# Patient Record
Sex: MALE | Race: WHITE
[De-identification: names, ages, dates, MRNs, and addresses within clinical notes are randomized per-mention and may not be internally consistent; named-entity substitution may affect disease eponyms.]

---

## 2022-04-21 ENCOUNTER — HOSPITAL ENCOUNTER (OUTPATIENT)
Age: 52
End: 2022-04-21
Payer: OTHER GOVERNMENT

## 2022-04-21 DIAGNOSIS — Z98.52: Primary | ICD-10-CM

## 2022-04-21 PROCEDURE — 89321 SEMEN ANAL SPERM DETECTION: CPT

## 2022-05-27 ENCOUNTER — HOSPITAL ENCOUNTER (OUTPATIENT)
Age: 52
End: 2022-05-27
Payer: OTHER GOVERNMENT

## 2022-05-27 DIAGNOSIS — R63.4: ICD-10-CM

## 2022-05-27 DIAGNOSIS — R19.09: ICD-10-CM

## 2022-05-27 DIAGNOSIS — Z12.5: ICD-10-CM

## 2022-05-27 DIAGNOSIS — F41.8: Primary | ICD-10-CM

## 2022-05-27 DIAGNOSIS — M25.521: ICD-10-CM

## 2022-05-27 LAB
ADD MANUAL DIFF / SLIDE REVIEW: NO
ALBUMIN SERPL-MCNC: 4.7 G/DL (ref 3.5–5)
ALBUMIN/GLOB SERPL: 1.6 {RATIO} (ref 1–2.8)
ALP SERPL-CCNC: 75 U/L (ref 38–126)
ALT SERPL-CCNC: 21 IU/L (ref ?–50)
BUN SERPL-MCNC: 15 MG/DL (ref 9–20)
CALCIUM SERPL-MCNC: 9.1 MG/DL (ref 8.4–10.2)
CHLORIDE SERPL-SCNC: 101 MMOL/L (ref 98–107)
CHOLEST SERPL-MCNC: 197 MG/DL (ref 140–199)
CO2 SERPL-SCNC: 27 MMOL/L (ref 22–32)
ESTIMATED GLOMERULAR FILT RATE: > 60 ML/MIN (ref 60–?)
GLOBULIN SER CALC-MCNC: 3 G/DL (ref 1.7–4.1)
GLUCOSE SERPL-MCNC: 97 MG/DL (ref 70–100)
HDLC SERPL-MCNC: 63 MG/DL (ref 40–60)
HEMATOCRIT: 43.3 % (ref 41–53)
HEMOGLOBIN: 15.1 G/DL (ref 13.5–17.5)
HEMOLYSIS: < 15 (ref 0–50)
LYMPHOCYTES # SPEC AUTO: 1600 /UL (ref 1100–4500)
MCV RBC: 94.9 FL (ref 80–100)
MEAN CORPUSCULAR HEMOGLOBIN: 33.2 PG (ref 26–34)
MEAN CORPUSCULAR HGB CONC: 34.9 % (ref 30–36)
MICROALBUMI CREATININ RATIO UR: (no result) UG/MG CR (ref ?–30)
PLATELET COUNT: 239 X10^3/UL (ref 150–400)
POTASSIUM SERPL-SCNC: 4.3 MMOL/L (ref 3.4–5.1)
PROT SERPL-MCNC: 7.7 G/DL (ref 6.3–8.2)
SODIUM SERPL-SCNC: 138 MMOL/L (ref 137–145)
TRIGL SERPL-MCNC: 56 MG/DL (ref 35–150)
TSH W/ REFLEX TO FT4: 1.36 UIU/ML (ref 0.47–4.68)

## 2022-05-27 PROCEDURE — 80053 COMPREHEN METABOLIC PANEL: CPT

## 2022-05-27 PROCEDURE — 80061 LIPID PANEL: CPT

## 2022-05-27 PROCEDURE — 84443 ASSAY THYROID STIM HORMONE: CPT

## 2022-05-27 PROCEDURE — 36415 COLL VENOUS BLD VENIPUNCTURE: CPT

## 2022-05-27 PROCEDURE — 85025 COMPLETE CBC W/AUTO DIFF WBC: CPT

## 2022-05-27 PROCEDURE — 82043 UR ALBUMIN QUANTITATIVE: CPT

## 2022-05-27 PROCEDURE — 82570 ASSAY OF URINE CREATININE: CPT

## 2022-06-30 ENCOUNTER — HOSPITAL ENCOUNTER (OUTPATIENT)
Age: 52
End: 2022-06-30
Payer: OTHER GOVERNMENT

## 2022-06-30 DIAGNOSIS — R19.09: Primary | ICD-10-CM

## 2022-06-30 PROCEDURE — 76705 ECHO EXAM OF ABDOMEN: CPT

## 2022-10-24 ENCOUNTER — HOSPITAL ENCOUNTER (OUTPATIENT)
Age: 52
End: 2022-10-24
Payer: OTHER GOVERNMENT

## 2022-10-24 DIAGNOSIS — F43.21: ICD-10-CM

## 2022-10-24 DIAGNOSIS — E55.9: Primary | ICD-10-CM

## 2022-10-24 DIAGNOSIS — R53.83: ICD-10-CM

## 2022-10-24 DIAGNOSIS — R68.82: ICD-10-CM

## 2022-10-24 LAB — 25(OH)D3+25(OH)D2 SERPL-MCNC: 34.5 NG/ML (ref 30–100)

## 2022-10-24 PROCEDURE — 36415 COLL VENOUS BLD VENIPUNCTURE: CPT

## 2022-10-24 PROCEDURE — 82306 VITAMIN D 25 HYDROXY: CPT

## 2022-10-24 PROCEDURE — 84403 ASSAY OF TOTAL TESTOSTERONE: CPT

## 2023-10-31 ENCOUNTER — HOSPITAL ENCOUNTER (OUTPATIENT)
Age: 53
End: 2023-10-31
Payer: OTHER GOVERNMENT

## 2023-10-31 DIAGNOSIS — F43.12: ICD-10-CM

## 2023-10-31 DIAGNOSIS — F41.8: ICD-10-CM

## 2023-10-31 DIAGNOSIS — Z12.5: ICD-10-CM

## 2023-10-31 DIAGNOSIS — E78.5: Primary | ICD-10-CM

## 2023-10-31 LAB
ADD MANUAL DIFF / SLIDE REVIEW: NO
ALBUMIN SERPL-MCNC: 4.3 G/DL (ref 3.5–5)
ALBUMIN/GLOB SERPL: 1.4 {RATIO} (ref 1–2.8)
ALP SERPL-CCNC: 71 U/L (ref 38–126)
ALT SERPL-CCNC: 28 IU/L (ref ?–50)
BUN SERPL-MCNC: 15 MG/DL (ref 9–20)
CALCIUM SERPL-MCNC: 9.4 MG/DL (ref 8.4–10.2)
CHLORIDE SERPL-SCNC: 100 MMOL/L (ref 98–107)
CHOLEST SERPL-MCNC: 185 MG/DL (ref 140–199)
CO2 SERPL-SCNC: 29 MMOL/L (ref 22–32)
ESTIMATED GLOMERULAR FILT RATE: > 60 ML/MIN (ref 60–?)
GLOBULIN SER CALC-MCNC: 3 G/DL (ref 1.7–4.1)
GLUCOSE SERPL-MCNC: 100 MG/DL (ref 70–100)
HDLC SERPL-MCNC: 65 MG/DL (ref 40–60)
HEMATOCRIT: 44.5 % (ref 41–53)
HEMOGLOBIN: 15.1 G/DL (ref 13.5–17.5)
HEMOLYSIS: < 15 (ref 0–50)
LYMPHOCYTES # SPEC AUTO: 1400 /UL (ref 1100–4500)
MCV RBC: 98.7 FL (ref 80–100)
MEAN CORPUSCULAR HEMOGLOBIN: 33.5 PG (ref 26–34)
MEAN CORPUSCULAR HGB CONC: 33.9 % (ref 30–36)
PLATELET COUNT: 247 X10^3/UL (ref 150–400)
POTASSIUM SERPL-SCNC: 4.1 MMOL/L (ref 3.4–5.1)
PROT SERPL-MCNC: 7.3 G/DL (ref 6.3–8.2)
SODIUM SERPL-SCNC: 136 MMOL/L (ref 137–145)
TRIGL SERPL-MCNC: 59 MG/DL (ref 35–150)
TSH W/ REFLEX TO FT4: 1.28 UIU/ML (ref 0.47–4.68)

## 2023-10-31 PROCEDURE — 85025 COMPLETE CBC W/AUTO DIFF WBC: CPT

## 2023-10-31 PROCEDURE — 80053 COMPREHEN METABOLIC PANEL: CPT

## 2023-10-31 PROCEDURE — 84443 ASSAY THYROID STIM HORMONE: CPT

## 2023-10-31 PROCEDURE — 80061 LIPID PANEL: CPT

## 2023-10-31 PROCEDURE — 36415 COLL VENOUS BLD VENIPUNCTURE: CPT

## 2025-03-25 ENCOUNTER — HOSPITAL ENCOUNTER (OUTPATIENT)
Age: 55
Discharge: HOME | End: 2025-03-25
Payer: OTHER GOVERNMENT

## 2025-03-25 VITALS
HEART RATE: 77 BPM | SYSTOLIC BLOOD PRESSURE: 99 MMHG | RESPIRATION RATE: 14 BRPM | DIASTOLIC BLOOD PRESSURE: 62 MMHG | OXYGEN SATURATION: 96 %

## 2025-03-25 VITALS — BODY MASS INDEX: 9.9 KG/M2 | BODY MASS INDEX: 21.7 KG/M2

## 2025-03-25 VITALS
SYSTOLIC BLOOD PRESSURE: 121 MMHG | OXYGEN SATURATION: 97 % | DIASTOLIC BLOOD PRESSURE: 68 MMHG | HEART RATE: 95 BPM | RESPIRATION RATE: 14 BRPM

## 2025-03-25 VITALS
TEMPERATURE: 97.16 F | DIASTOLIC BLOOD PRESSURE: 65 MMHG | HEART RATE: 91 BPM | SYSTOLIC BLOOD PRESSURE: 112 MMHG | RESPIRATION RATE: 14 BRPM | OXYGEN SATURATION: 98 %

## 2025-03-25 VITALS
HEART RATE: 55 BPM | RESPIRATION RATE: 16 BRPM | OXYGEN SATURATION: 99 % | SYSTOLIC BLOOD PRESSURE: 118 MMHG | TEMPERATURE: 97.1 F | DIASTOLIC BLOOD PRESSURE: 72 MMHG

## 2025-03-25 VITALS
HEART RATE: 91 BPM | RESPIRATION RATE: 14 BRPM | DIASTOLIC BLOOD PRESSURE: 75 MMHG | OXYGEN SATURATION: 98 % | SYSTOLIC BLOOD PRESSURE: 115 MMHG

## 2025-03-25 VITALS
TEMPERATURE: 97.7 F | SYSTOLIC BLOOD PRESSURE: 119 MMHG | OXYGEN SATURATION: 96 % | HEART RATE: 91 BPM | RESPIRATION RATE: 16 BRPM | DIASTOLIC BLOOD PRESSURE: 72 MMHG

## 2025-03-25 DIAGNOSIS — K40.90: Primary | ICD-10-CM

## 2025-03-25 DIAGNOSIS — K42.9: ICD-10-CM

## 2025-03-25 PROCEDURE — C1781 MESH (IMPLANTABLE): HCPCS

## 2025-03-25 PROCEDURE — 49650 LAP ING HERNIA REPAIR INIT: CPT

## 2025-03-25 PROCEDURE — 0YQ54ZZ REPAIR RIGHT INGUINAL REGION, PERCUTANEOUS ENDOSCOPIC APPROACH: ICD-10-PCS | Performed by: SURGERY

## 2025-03-25 NOTE — PM.OP.1
Operative Date/Time/Diagnoses
Date of procedure: 03/25/25
Time of procedure: 08:57
Pre-op diagnosis: Right inguinal hernia
Post-op diagnosis: other (Right direct inguinal hernia)

Procedure & Clinicians
Procedure: 
Laparoscopic right inguinal hernia repair with mesh
Same procedure as scheduled: Yes
Surgeon: Mauro De La Torre
Assistant: Jeffery Jain
Anesthesia Type: General
Operative Notes
Procedure in detail: 
The patient was given preoperative antibiotics.  The patient was brought to the operating room, placed on the table in the supine position with the arms tucked and general anesthesia was induced.  The abdomen was prepped and draped in the usual 
fashion.  A time-out was performed.  A 1 cm supraumbilical incision was created and dissection was carried down to the anterior sheath.  There was a small fat containing umbilical hernia.  The fatty contents were amputated and the base of the 
umbilicus was transected.  The fascial defect was a proximally 5 mm and had to be opened laterally with Bovie in each direction until it was 1 cm.  The fascia was grasped with a Kocher clamp to elevate abdominal wall and a Peon clamp was used to 
hunt the peritoneum.  The Brittani port was placed and the abdomen was insufflated to 15 mmHg.  The camera was inserted, there was no evidence of any injury from the entry.  There was a direct right inguinal hernia.  5 mm ports were placed under 
direct vision in the mid left and mid right abdomen.  The patient was positioned in steep Trendelenburg.  We created right peritoneal flap.  The peritoneum was dissected off the right cord structures and the fatty contents were reduced from the 
direct defect.  A large right Bard mesh was brought in and placed over the defect with the medial edge against Reinaldo's ligament.  We then closed the peritoneal flap with a running 3-0 barbed suture.  We took one last look around the abdomen and saw 
no other abnormalities.  The suture was removed and accounted for.  The 5 mm ports were removed under direct vision.  The abdomen was desufflated.  The Brittani port was removed.  Additional local was injected into the fascia and the fascial incision 
was closed with 2 interrupted 0 Vicryl sutures.  The skin incisions were closed with 4 Monocryl, Steri-Strips and Band-Aids.

EBL:  10 mL 

Jeffery NAVARRETE provided assistance with exposure, retraction and closure of incisions.
Post-operative
Condition: stable
Disposition: PACU

## 2025-03-25 NOTE — PM.HP.IH.1
History of Present Illness
History of Present Illness
Date Patient Seen: 03/25/25
Time Patient Seen: 07:35
Chief complaint: Lap R inguinal hernia repair w/mesh
Narrative: 
Viet is a 54-year-old man with a symptomatic right inguinal hernia.  See the office note from February for details.  No health changes since then.

Formerly [facility]
Surgical History (Reviewed 10/21/22 @ 10:50 by RAJESH Almeida)

Vasectomy status


Social History
household members:  spouse 
Smoking Status:  Never smoker 



Meds
Home Medications and Allergies
Home Medications

 Medication  Instructions  Recorded  Confirmed  Type
aspirin-acetaminophen-caffeine 250 1 tab PO Q4-6H PRN 01/14/25 02/05/25 History
mg-250 mg-65 mg tablet (Excedrin    
Migraine)    


Allergies

Allergy/AdvReac Type Severity Reaction Status Date / Time
codeine AdvReac Mild Anxiety Verified 03/25/25 06:37



Exam
Vital Signs
(past 8 hours): 
-

 03/25/25
06:50
Temperature 97.1 F L
Pulse Rate 55 L
Respiratory Rate 16
Blood Pressure 118/72
Pulse Oximetry 99
Oxygen Delivery Method Room Air



Oxygen Delivery Method         Room Air                                          



Const
General: healthy appearing
Resp
Effort & Inspection: normal respiratory effort

Assessment & Plan
Assessment and plan
(1) Right inguinal hernia: 
      Status: Acute

Plan
Laparoscopic right inguinal hernia repair with mesh
Time-Based Coding
:: 
[TOTAL MINUTES] spent with patient and on the chart (including review of chart, obtaining history, exam, reviewing outside data, placing orders, documenting exam and treatment plan, and counseling patient) on [DATE].


 PROFEE
Charge Entry
Document charge(s): No

## 2025-03-25 NOTE — P.OP_ITS
Operative Date/Time/Diagnoses    
Date of procedure: 03/25/25    
Time of procedure: 08:57    
Pre-op diagnosis: Right inguinal hernia    
Post-op diagnosis: other (Right direct inguinal hernia)    
    
Procedure & Clinicians    
Procedure:     
Laparoscopic right inguinal hernia repair with mesh    
Same procedure as scheduled: Yes    
Surgeon: Mauro De La Torre    
Assistant: Jeffery Jain    
Anesthesia Type: General    
Operative Notes    
Procedure in detail:     
The patient was given preoperative antibiotics.  The patient was brought to the   
operating room, placed on the table in the supine position with the arms tucked   
and general anesthesia was induced.  The abdomen was prepped and draped in the   
usual fashion.  A time-out was performed.  A 1 cm supraumbilical incision was   
created and dissection was carried down to the anterior sheath.  There was a   
small fat containing umbilical hernia.  The fatty contents were amputated and   
the base of the umbilicus was transected.  The fascial defect was a proximally 5  
mm and had to be opened laterally with Bovie in each direction until it was 1 cm  
.  The fascia was grasped with a Kocher clamp to elevate abdominal wall and a   
Peon clamp was used to hunt the peritoneum.  The Brittani port was placed and   
the abdomen was insufflated to 15 mmHg.  The camera was inserted, there was no   
evidence of any injury from the entry.  There was a direct right inguinal   
hernia.  5 mm ports were placed under direct vision in the mid left and mid   
right abdomen.  The patient was positioned in steep Trendelenburg.  We created   
right peritoneal flap.  The peritoneum was dissected off the right cord   
structures and the fatty contents were reduced from the direct defect.  A large   
right Bard mesh was brought in and placed over the defect with the medial edge   
against Reinaldo's ligament.  We then closed the peritoneal flap with a running 3-  
0 barbed suture.  We took one last look around the abdomen and saw no other   
abnormalities.  The suture was removed and accounted for.  The 5 mm ports were   
removed under direct vision.  The abdomen was desufflated.  The Brittani port was   
removed.  Additional local was injected into the fascia and the fascial incision  
was closed with 2 interrupted 0 Vicryl sutures.  The skin incisions were closed   
with 4 Monocryl, Steri-Strips and Band-Aids.    
    
EBL:  10 mL     
    
Jeffery NAVARRETE provided assistance with exposure, retraction and closure of   
incisions.    
Post-operative    
Condition: stable    
Disposition: PACU

## 2025-03-25 NOTE — P.HP_ITS
History of Present Illness    
History of Present Illness    
Date Patient Seen: 03/25/25    
Time Patient Seen: 07:35    
Chief complaint: Lap R inguinal hernia repair w/mesh    
Narrative:     
Viet is a 54-year-old man with a symptomatic right inguinal hernia.  See the   
office note from February for details.  No health changes since then.    
    
[facility]    
Surgical History (Reviewed 10/21/22 @ 10:50 by RAJESH Almeida)    
    
Vasectomy status    
    
    
Social History    
household members:  spouse     
Smoking Status:  Never smoker     
    
    
    
Meds    
Home Medications and Allergies    
                                Home Medications    
    
    
    
 Medication  Instructions  Recorded  Confirmed  Type    
     
aspirin-acetaminophen-caffeine 250 1 tab PO Q4-6H PRN 01/14/25 02/05/25 History    
    
mg-250 mg-65 mg tablet (Excedrin        
    
Migraine)        
    
    
    
                                    Allergies    
    
    
    
Allergy/AdvReac Type Severity Reaction Status Date / Time    
     
codeine AdvReac Mild Anxiety Verified 03/25/25 06:37    
    
    
    
    
Exam    
Vital Signs    
(past 8 hours):     
                                        -    
    
    
    
 03/25/25    
06:50    
     
Temperature 97.1 F L    
     
Pulse Rate 55 L    
     
Respiratory Rate 16    
     
Blood Pressure 118/72    
     
Pulse Oximetry 99    
     
Oxygen Delivery Method Room Air    
    
    
                                            
    
    
    
Oxygen Delivery Method         Room Air                                           
    
    
    
    
    
Const    
General: healthy appearing    
Resp    
Effort & Inspection: normal respiratory effort    
    
Assessment & Plan    
Assessment and plan    
(1) Right inguinal hernia:     
      Status: Acute    
    
Plan    
Laparoscopic right inguinal hernia repair with mesh    
Time-Based Coding    
::     
[TOTAL MINUTES] spent with patient and on the chart (including review of chart,   
obtaining history, exam, reviewing outside data, placing orders, documenting   
exam and treatment plan, and counseling patient) on [DATE].    
    
    
 PROFEE    
Charge Entry    
Document charge(s): No